# Patient Record
Sex: FEMALE | Race: WHITE | NOT HISPANIC OR LATINO | Employment: FULL TIME | URBAN - METROPOLITAN AREA
[De-identification: names, ages, dates, MRNs, and addresses within clinical notes are randomized per-mention and may not be internally consistent; named-entity substitution may affect disease eponyms.]

---

## 2018-01-15 ENCOUNTER — HOSPITAL ENCOUNTER (OUTPATIENT)
Dept: RADIOLOGY | Facility: HOSPITAL | Age: 58
Discharge: HOME | End: 2018-01-15
Attending: OBSTETRICS & GYNECOLOGY
Payer: COMMERCIAL

## 2018-01-15 DIAGNOSIS — Z12.31 ENCOUNTER FOR SCREENING MAMMOGRAM FOR BREAST CANCER: ICD-10-CM

## 2018-01-15 PROCEDURE — 77063 BREAST TOMOSYNTHESIS BI: CPT | Mod: 26,,, | Performed by: RADIOLOGY

## 2018-01-15 PROCEDURE — 77067 SCR MAMMO BI INCL CAD: CPT | Mod: 26,,, | Performed by: RADIOLOGY

## 2018-01-15 PROCEDURE — 77067 SCR MAMMO BI INCL CAD: CPT | Mod: TC

## 2018-01-24 ENCOUNTER — TELEPHONE (OUTPATIENT)
Dept: RADIOLOGY | Facility: HOSPITAL | Age: 58
End: 2018-01-24

## 2018-01-30 ENCOUNTER — TELEPHONE (OUTPATIENT)
Dept: RADIOLOGY | Facility: HOSPITAL | Age: 58
End: 2018-01-30

## 2018-02-01 ENCOUNTER — HOSPITAL ENCOUNTER (OUTPATIENT)
Dept: RADIOLOGY | Facility: HOSPITAL | Age: 58
Discharge: HOME | End: 2018-02-01
Attending: OBSTETRICS & GYNECOLOGY
Payer: COMMERCIAL

## 2018-02-01 DIAGNOSIS — R92.8 ABNORMAL MAMMOGRAM OF RIGHT BREAST: ICD-10-CM

## 2018-02-01 PROCEDURE — 77061 BREAST TOMOSYNTHESIS UNI: CPT | Mod: 26,,, | Performed by: RADIOLOGY

## 2018-02-01 PROCEDURE — 77065 DX MAMMO INCL CAD UNI: CPT | Mod: 26,,, | Performed by: RADIOLOGY

## 2018-02-01 PROCEDURE — 77061 BREAST TOMOSYNTHESIS UNI: CPT | Mod: TC

## 2018-02-01 PROCEDURE — 77065 DX MAMMO INCL CAD UNI: CPT | Mod: TC

## 2018-02-05 ENCOUNTER — TELEPHONE (OUTPATIENT)
Dept: RADIOLOGY | Facility: HOSPITAL | Age: 58
End: 2018-02-05

## 2018-02-05 NOTE — TELEPHONE ENCOUNTER
Spoke with patient. Reviewed breast biopsy procedure and reviewed instructions for breast biopsy. Patient expressed understanding and all questions were answered. Provided patient with my phone number to call for any further concerns or questions.   Patient scheduled breast biopsy at the [facility] on 2/19/18

## 2018-02-19 ENCOUNTER — HOSPITAL ENCOUNTER (OUTPATIENT)
Dept: RADIOLOGY | Facility: HOSPITAL | Age: 58
Discharge: HOME | End: 2018-02-19
Attending: OBSTETRICS & GYNECOLOGY
Payer: COMMERCIAL

## 2018-02-19 DIAGNOSIS — R92.8 ABNORMAL MAMMOGRAM: ICD-10-CM

## 2018-02-19 PROCEDURE — 77065 DX MAMMO INCL CAD UNI: CPT | Mod: TC,PO,RT

## 2018-02-19 PROCEDURE — 88305 TISSUE EXAM BY PATHOLOGIST: CPT | Mod: 26,,, | Performed by: PATHOLOGY

## 2018-02-19 PROCEDURE — 19081 BX BREAST 1ST LESION STRTCTC: CPT | Mod: TC,PO,RT

## 2018-02-19 PROCEDURE — 27201044 MAMMO BREAST STEREOTACTIC BREAST BIOPSY RIGHT: Mod: PO

## 2018-02-19 PROCEDURE — 88305 TISSUE EXAM BY PATHOLOGIST: CPT | Performed by: PATHOLOGY

## 2018-02-19 PROCEDURE — 88360 TUMOR IMMUNOHISTOCHEM/MANUAL: CPT | Mod: 26,,, | Performed by: PATHOLOGY

## 2018-02-19 PROCEDURE — 77065 DX MAMMO INCL CAD UNI: CPT | Mod: 26,RT,, | Performed by: RADIOLOGY

## 2018-02-19 PROCEDURE — 88342 IMHCHEM/IMCYTCHM 1ST ANTB: CPT | Mod: 26,59,, | Performed by: PATHOLOGY

## 2018-02-19 PROCEDURE — 19081 BX BREAST 1ST LESION STRTCTC: CPT | Mod: RT,,, | Performed by: RADIOLOGY

## 2018-06-12 ENCOUNTER — HOSPITAL ENCOUNTER (OUTPATIENT)
Dept: RADIOLOGY | Facility: OTHER | Age: 58
Discharge: HOME | End: 2018-06-12
Attending: PLASTIC SURGERY
Payer: COMMERCIAL

## 2018-06-12 DIAGNOSIS — Z01.818 PRE-OP EXAM: ICD-10-CM

## 2018-06-12 PROCEDURE — 73706 CT ANGIO LWR EXTR W/O&W/DYE: CPT | Mod: 26,50,, | Performed by: RADIOLOGY

## 2018-06-12 PROCEDURE — 25500020 PHARM REV CODE 255: Performed by: PLASTIC SURGERY

## 2018-06-12 PROCEDURE — 74174 CTA ABD&PLVS W/CONTRAST: CPT | Mod: 26,,, | Performed by: RADIOLOGY

## 2018-06-12 PROCEDURE — 73706 CT ANGIO LWR EXTR W/O&W/DYE: CPT | Mod: 50,TC

## 2018-06-12 PROCEDURE — 74174 CTA ABD&PLVS W/CONTRAST: CPT | Mod: TC

## 2018-06-12 RX ADMIN — IOHEXOL 100 ML: 350 INJECTION, SOLUTION INTRAVENOUS at 09:06

## 2019-12-19 PROBLEM — C50.912 MALIGNANT NEOPLASM OF LEFT FEMALE BREAST: Status: ACTIVE | Noted: 2019-12-19

## 2021-01-29 ENCOUNTER — CLINICAL SUPPORT (OUTPATIENT)
Dept: URGENT CARE | Facility: CLINIC | Age: 61
End: 2021-01-29
Payer: COMMERCIAL

## 2021-01-29 DIAGNOSIS — Z20.822 ENCOUNTER FOR LABORATORY TESTING FOR COVID-19 VIRUS: Primary | ICD-10-CM

## 2021-01-29 LAB
CTP QC/QA: YES
SARS-COV-2 RDRP RESP QL NAA+PROBE: NEGATIVE

## 2021-01-29 PROCEDURE — U0002: ICD-10-PCS | Mod: QW,S$GLB,, | Performed by: STUDENT IN AN ORGANIZED HEALTH CARE EDUCATION/TRAINING PROGRAM

## 2021-01-29 PROCEDURE — U0002 COVID-19 LAB TEST NON-CDC: HCPCS | Mod: QW,S$GLB,, | Performed by: STUDENT IN AN ORGANIZED HEALTH CARE EDUCATION/TRAINING PROGRAM

## 2021-04-12 ENCOUNTER — HOSPITAL ENCOUNTER (INPATIENT)
Facility: HOSPITAL | Age: 61
LOS: 3 days | Discharge: HOME | DRG: 286 | End: 2021-04-15
Attending: EMERGENCY MEDICINE | Admitting: INTERNAL MEDICINE
Payer: COMMERCIAL

## 2021-04-12 DIAGNOSIS — R41.89 UNRESPONSIVE: ICD-10-CM

## 2021-04-12 DIAGNOSIS — R56.9 SEIZURE-LIKE ACTIVITY: ICD-10-CM

## 2021-04-12 DIAGNOSIS — I42.2 HYPERTROPHIC CARDIOMYOPATHY: ICD-10-CM

## 2021-04-12 DIAGNOSIS — I47.29 POLYMORPHIC VENTRICULAR TACHYCARDIA: ICD-10-CM

## 2021-04-12 DIAGNOSIS — R79.89 ELEVATED TROPONIN: ICD-10-CM

## 2021-04-12 DIAGNOSIS — I48.0 PAROXYSMAL A-FIB: ICD-10-CM

## 2021-04-12 DIAGNOSIS — I47.20 V TACH: ICD-10-CM

## 2021-04-12 DIAGNOSIS — I42.1 HOCM (HYPERTROPHIC OBSTRUCTIVE CARDIOMYOPATHY): ICD-10-CM

## 2021-04-12 DIAGNOSIS — I47.20 VENTRICULAR TACHYCARDIA: ICD-10-CM

## 2021-04-12 DIAGNOSIS — R56.9 SEIZURE: ICD-10-CM

## 2021-04-12 DIAGNOSIS — I46.9 CARDIAC ARREST: Primary | ICD-10-CM

## 2021-04-12 DIAGNOSIS — R07.9 CHEST PAIN: ICD-10-CM

## 2021-04-12 PROBLEM — E78.5 HLD (HYPERLIPIDEMIA): Status: ACTIVE | Noted: 2021-04-12

## 2021-04-12 LAB
ALBUMIN SERPL BCP-MCNC: 4.2 G/DL (ref 3.5–5.2)
ALP SERPL-CCNC: 68 U/L (ref 55–135)
ALT SERPL W/O P-5'-P-CCNC: 45 U/L (ref 10–44)
AMPHET+METHAMPHET UR QL: NEGATIVE
ANION GAP SERPL CALC-SCNC: 9 MMOL/L (ref 8–16)
AST SERPL-CCNC: 58 U/L (ref 10–40)
BACTERIA #/AREA URNS HPF: ABNORMAL /HPF
BARBITURATES UR QL SCN>200 NG/ML: NEGATIVE
BASOPHILS # BLD AUTO: 0.1 K/UL (ref 0–0.2)
BASOPHILS NFR BLD: 0.9 % (ref 0–1.9)
BENZODIAZ UR QL SCN>200 NG/ML: NEGATIVE
BILIRUB SERPL-MCNC: 0.7 MG/DL (ref 0.1–1)
BILIRUB UR QL STRIP: NEGATIVE
BUN SERPL-MCNC: 17 MG/DL (ref 6–20)
BZE UR QL SCN: NEGATIVE
CALCIUM SERPL-MCNC: 8.4 MG/DL (ref 8.7–10.5)
CANNABINOIDS UR QL SCN: NEGATIVE
CHLORIDE SERPL-SCNC: 110 MMOL/L (ref 95–110)
CK SERPL-CCNC: 107 U/L (ref 20–180)
CLARITY UR: CLEAR
CO2 SERPL-SCNC: 20 MMOL/L (ref 23–29)
COLOR UR: YELLOW
CREAT SERPL-MCNC: 0.9 MG/DL (ref 0.5–1.4)
CREAT UR-MCNC: 84.6 MG/DL (ref 15–325)
DIFFERENTIAL METHOD: ABNORMAL
EOSINOPHIL # BLD AUTO: 0.2 K/UL (ref 0–0.5)
EOSINOPHIL NFR BLD: 1.7 % (ref 0–8)
ERYTHROCYTE [DISTWIDTH] IN BLOOD BY AUTOMATED COUNT: 13.2 % (ref 11.5–14.5)
EST. GFR  (AFRICAN AMERICAN): >60 ML/MIN/1.73 M^2
EST. GFR  (NON AFRICAN AMERICAN): >60 ML/MIN/1.73 M^2
GLUCOSE SERPL-MCNC: 174 MG/DL (ref 70–110)
GLUCOSE UR QL STRIP: NEGATIVE
HCT VFR BLD AUTO: 43.5 % (ref 37–48.5)
HGB BLD-MCNC: 13.8 G/DL (ref 12–16)
HGB UR QL STRIP: ABNORMAL
HYALINE CASTS #/AREA URNS LPF: 15 /LPF
IMM GRANULOCYTES # BLD AUTO: 0.08 K/UL (ref 0–0.04)
IMM GRANULOCYTES NFR BLD AUTO: 0.7 % (ref 0–0.5)
KETONES UR QL STRIP: NEGATIVE
LACTATE SERPL-SCNC: 1.9 MMOL/L (ref 0.5–2.2)
LACTATE SERPL-SCNC: 2.6 MMOL/L (ref 0.5–2.2)
LEUKOCYTE ESTERASE UR QL STRIP: NEGATIVE
LYMPHOCYTES # BLD AUTO: 2.7 K/UL (ref 1–4.8)
LYMPHOCYTES NFR BLD: 23.5 % (ref 18–48)
MAGNESIUM SERPL-MCNC: 2 MG/DL (ref 1.6–2.6)
MCH RBC QN AUTO: 32.1 PG (ref 27–31)
MCHC RBC AUTO-ENTMCNC: 31.7 G/DL (ref 32–36)
MCV RBC AUTO: 101 FL (ref 82–98)
METHADONE UR QL SCN>300 NG/ML: NEGATIVE
MICROSCOPIC COMMENT: ABNORMAL
MONOCYTES # BLD AUTO: 0.7 K/UL (ref 0.3–1)
MONOCYTES NFR BLD: 6.2 % (ref 4–15)
NEUTROPHILS # BLD AUTO: 7.6 K/UL (ref 1.8–7.7)
NEUTROPHILS NFR BLD: 67 % (ref 38–73)
NITRITE UR QL STRIP: NEGATIVE
NRBC BLD-RTO: 0 /100 WBC
OPIATES UR QL SCN: NEGATIVE
PCP UR QL SCN>25 NG/ML: NEGATIVE
PH UR STRIP: 6 [PH] (ref 5–8)
PLATELET # BLD AUTO: 184 K/UL (ref 150–450)
PMV BLD AUTO: 11.5 FL (ref 9.2–12.9)
POTASSIUM SERPL-SCNC: 4.2 MMOL/L (ref 3.5–5.1)
PROT SERPL-MCNC: 6.8 G/DL (ref 6–8.4)
PROT UR QL STRIP: ABNORMAL
RBC # BLD AUTO: 4.3 M/UL (ref 4–5.4)
RBC #/AREA URNS HPF: 10 /HPF (ref 0–4)
SARS-COV-2 RDRP RESP QL NAA+PROBE: NEGATIVE
SODIUM SERPL-SCNC: 139 MMOL/L (ref 136–145)
SP GR UR STRIP: 1.03 (ref 1–1.03)
SQUAMOUS #/AREA URNS HPF: 3 /HPF
TOXICOLOGY INFORMATION: NORMAL
TROPONIN I SERPL DL<=0.01 NG/ML-MCNC: 0.06 NG/ML (ref 0–0.03)
TROPONIN I SERPL DL<=0.01 NG/ML-MCNC: 0.11 NG/ML (ref 0–0.03)
TROPONIN I SERPL DL<=0.01 NG/ML-MCNC: 0.22 NG/ML (ref 0–0.03)
URN SPEC COLLECT METH UR: ABNORMAL
UROBILINOGEN UR STRIP-ACNC: NEGATIVE EU/DL
WBC # BLD AUTO: 11.35 K/UL (ref 3.9–12.7)
WBC #/AREA URNS HPF: 0 /HPF (ref 0–5)

## 2021-04-12 PROCEDURE — 96365 THER/PROPH/DIAG IV INF INIT: CPT

## 2021-04-12 PROCEDURE — 93010 EKG 12-LEAD: ICD-10-PCS | Mod: 77,,, | Performed by: INTERNAL MEDICINE

## 2021-04-12 PROCEDURE — 93005 ELECTROCARDIOGRAM TRACING: CPT

## 2021-04-12 PROCEDURE — 99292 CRITICAL CARE ADDL 30 MIN: CPT

## 2021-04-12 PROCEDURE — 84484 ASSAY OF TROPONIN QUANT: CPT | Mod: 91 | Performed by: EMERGENCY MEDICINE

## 2021-04-12 PROCEDURE — 99291 CRITICAL CARE FIRST HOUR: CPT | Mod: 25

## 2021-04-12 PROCEDURE — 93010 ELECTROCARDIOGRAM REPORT: CPT | Mod: 77,,, | Performed by: INTERNAL MEDICINE

## 2021-04-12 PROCEDURE — 85730 THROMBOPLASTIN TIME PARTIAL: CPT | Performed by: STUDENT IN AN ORGANIZED HEALTH CARE EDUCATION/TRAINING PROGRAM

## 2021-04-12 PROCEDURE — 80307 DRUG TEST PRSMV CHEM ANLYZR: CPT | Performed by: EMERGENCY MEDICINE

## 2021-04-12 PROCEDURE — 20000000 HC ICU ROOM

## 2021-04-12 PROCEDURE — 93010 EKG 12-LEAD: ICD-10-PCS | Mod: ,,, | Performed by: INTERNAL MEDICINE

## 2021-04-12 PROCEDURE — 81000 URINALYSIS NONAUTO W/SCOPE: CPT | Mod: 59 | Performed by: EMERGENCY MEDICINE

## 2021-04-12 PROCEDURE — 85610 PROTHROMBIN TIME: CPT | Performed by: STUDENT IN AN ORGANIZED HEALTH CARE EDUCATION/TRAINING PROGRAM

## 2021-04-12 PROCEDURE — 96367 TX/PROPH/DG ADDL SEQ IV INF: CPT

## 2021-04-12 PROCEDURE — 83605 ASSAY OF LACTIC ACID: CPT | Performed by: EMERGENCY MEDICINE

## 2021-04-12 PROCEDURE — 83735 ASSAY OF MAGNESIUM: CPT | Performed by: EMERGENCY MEDICINE

## 2021-04-12 PROCEDURE — 96361 HYDRATE IV INFUSION ADD-ON: CPT

## 2021-04-12 PROCEDURE — 96375 TX/PRO/DX INJ NEW DRUG ADDON: CPT

## 2021-04-12 PROCEDURE — 63600175 PHARM REV CODE 636 W HCPCS: Performed by: EMERGENCY MEDICINE

## 2021-04-12 PROCEDURE — 80053 COMPREHEN METABOLIC PANEL: CPT | Performed by: EMERGENCY MEDICINE

## 2021-04-12 PROCEDURE — 96366 THER/PROPH/DIAG IV INF ADDON: CPT

## 2021-04-12 PROCEDURE — 85025 COMPLETE CBC W/AUTO DIFF WBC: CPT | Performed by: EMERGENCY MEDICINE

## 2021-04-12 PROCEDURE — U0002 COVID-19 LAB TEST NON-CDC: HCPCS | Performed by: EMERGENCY MEDICINE

## 2021-04-12 PROCEDURE — 63600175 PHARM REV CODE 636 W HCPCS

## 2021-04-12 PROCEDURE — 82550 ASSAY OF CK (CPK): CPT | Performed by: EMERGENCY MEDICINE

## 2021-04-12 PROCEDURE — 36415 COLL VENOUS BLD VENIPUNCTURE: CPT | Performed by: STUDENT IN AN ORGANIZED HEALTH CARE EDUCATION/TRAINING PROGRAM

## 2021-04-12 PROCEDURE — 93010 ELECTROCARDIOGRAM REPORT: CPT | Mod: ,,, | Performed by: INTERNAL MEDICINE

## 2021-04-12 PROCEDURE — 84484 ASSAY OF TROPONIN QUANT: CPT | Mod: 91 | Performed by: STUDENT IN AN ORGANIZED HEALTH CARE EDUCATION/TRAINING PROGRAM

## 2021-04-12 PROCEDURE — 25000003 PHARM REV CODE 250: Performed by: EMERGENCY MEDICINE

## 2021-04-12 RX ORDER — LEVETIRACETAM 10 MG/ML
1000 INJECTION INTRAVASCULAR
Status: DISPENSED | OUTPATIENT
Start: 2021-04-12 | End: 2021-04-12

## 2021-04-12 RX ORDER — MUPIROCIN 20 MG/G
OINTMENT TOPICAL 2 TIMES DAILY
Status: DISCONTINUED | OUTPATIENT
Start: 2021-04-12 | End: 2021-04-15 | Stop reason: HOSPADM

## 2021-04-12 RX ORDER — SODIUM CHLORIDE 0.9 % (FLUSH) 0.9 %
10 SYRINGE (ML) INJECTION
Status: DISCONTINUED | OUTPATIENT
Start: 2021-04-13 | End: 2021-04-15 | Stop reason: HOSPADM

## 2021-04-12 RX ORDER — ATORVASTATIN CALCIUM 40 MG/1
40 TABLET, FILM COATED ORAL DAILY
Status: DISCONTINUED | OUTPATIENT
Start: 2021-04-13 | End: 2021-04-15 | Stop reason: HOSPADM

## 2021-04-12 RX ORDER — MAGNESIUM SULFATE HEPTAHYDRATE 40 MG/ML
2 INJECTION, SOLUTION INTRAVENOUS ONCE
Status: COMPLETED | OUTPATIENT
Start: 2021-04-12 | End: 2021-04-12

## 2021-04-12 RX ORDER — HEPARIN SODIUM,PORCINE/D5W 25000/250
0-24 INTRAVENOUS SOLUTION INTRAVENOUS CONTINUOUS
Status: DISCONTINUED | OUTPATIENT
Start: 2021-04-12 | End: 2021-04-13

## 2021-04-12 RX ORDER — LORAZEPAM 2 MG/ML
INJECTION INTRAMUSCULAR
Status: COMPLETED
Start: 2021-04-12 | End: 2021-04-12

## 2021-04-12 RX ORDER — ATORVASTATIN CALCIUM 40 MG/1
40 TABLET, FILM COATED ORAL DAILY
COMMUNITY

## 2021-04-12 RX ORDER — AMIODARONE HYDROCHLORIDE 100 MG/1
100 TABLET ORAL DAILY
COMMUNITY

## 2021-04-12 RX ORDER — MAGNESIUM SULFATE HEPTAHYDRATE 40 MG/ML
0.5 INJECTION, SOLUTION INTRAVENOUS CONTINUOUS
Status: DISCONTINUED | OUTPATIENT
Start: 2021-04-12 | End: 2021-04-12

## 2021-04-12 RX ORDER — ASPIRIN 325 MG
325 TABLET ORAL DAILY
Status: DISCONTINUED | OUTPATIENT
Start: 2021-04-13 | End: 2021-04-13

## 2021-04-12 RX ORDER — ATORVASTATIN CALCIUM 40 MG/1
40 TABLET, FILM COATED ORAL DAILY
Status: DISCONTINUED | OUTPATIENT
Start: 2021-04-13 | End: 2021-04-12

## 2021-04-12 RX ORDER — MONTELUKAST SODIUM 10 MG/1
10 TABLET ORAL DAILY
Status: DISCONTINUED | OUTPATIENT
Start: 2021-04-13 | End: 2021-04-15 | Stop reason: HOSPADM

## 2021-04-12 RX ADMIN — MAGNESIUM SULFATE IN WATER 2 G: 40 INJECTION, SOLUTION INTRAVENOUS at 11:04

## 2021-04-12 RX ADMIN — LORAZEPAM 2 MG: 2 INJECTION INTRAMUSCULAR; INTRAVENOUS at 07:04

## 2021-04-12 RX ADMIN — SODIUM CHLORIDE 1000 ML: 0.9 INJECTION, SOLUTION INTRAVENOUS at 07:04

## 2021-04-12 RX ADMIN — LORAZEPAM 2 MG: 2 INJECTION INTRAMUSCULAR; INTRAVENOUS at 08:04

## 2021-04-12 RX ADMIN — MAGNESIUM SULFATE IN WATER 2 G: 40 INJECTION, SOLUTION INTRAVENOUS at 10:04

## 2021-04-12 RX ADMIN — LEVETIRACETAM 1000 MG: 10 INJECTION INTRAVENOUS at 07:04

## 2021-04-12 RX ADMIN — LORAZEPAM: 2 INJECTION INTRAMUSCULAR; INTRAVENOUS at 08:04

## 2021-04-13 PROBLEM — I25.10 CORONARY ARTERY DISEASE INVOLVING NATIVE CORONARY ARTERY WITHOUT ANGINA PECTORIS: Status: ACTIVE | Noted: 2021-04-13

## 2021-04-13 PROBLEM — I25.119 CORONARY ARTERY DISEASE INVOLVING NATIVE CORONARY ARTERY WITH ANGINA PECTORIS: Status: ACTIVE | Noted: 2021-04-13

## 2021-04-13 LAB
ALBUMIN SERPL BCP-MCNC: 3.7 G/DL (ref 3.5–5.2)
ALLENS TEST: ABNORMAL
ALP SERPL-CCNC: 54 U/L (ref 55–135)
ALT SERPL W/O P-5'-P-CCNC: 32 U/L (ref 10–44)
AMMONIA PLAS-SCNC: 36 UMOL/L (ref 10–50)
ANION GAP SERPL CALC-SCNC: 5 MMOL/L (ref 8–16)
AORTIC ROOT ANNULUS: 3.62 CM
APTT BLDCRRT: 25.9 SEC (ref 21–32)
APTT BLDCRRT: 34.3 SEC (ref 21–32)
APTT BLDCRRT: 41.6 SEC (ref 21–32)
ASCENDING AORTA: 3.09 CM
AST SERPL-CCNC: 41 U/L (ref 10–40)
BASOPHILS # BLD AUTO: 0.05 K/UL (ref 0–0.2)
BASOPHILS NFR BLD: 0.6 % (ref 0–1.9)
BILIRUB SERPL-MCNC: 1.3 MG/DL (ref 0.1–1)
BNP SERPL-MCNC: 559 PG/ML (ref 0–99)
BSA FOR ECHO PROCEDURE: 2.05 M2
BUN SERPL-MCNC: 14 MG/DL (ref 6–20)
CALCIUM SERPL-MCNC: 8.4 MG/DL (ref 8.7–10.5)
CHLORIDE SERPL-SCNC: 107 MMOL/L (ref 95–110)
CO2 SERPL-SCNC: 25 MMOL/L (ref 23–29)
CREAT SERPL-MCNC: 0.7 MG/DL (ref 0.5–1.4)
CV ECHO LV RWT: 0.54 CM
DELSYS: ABNORMAL
DIFFERENTIAL METHOD: ABNORMAL
DOP CALC LVOT AREA: 3.8 CM2
DOP CALC LVOT DIAMETER: 2.19 CM
E WAVE DECELERATION TIME: 318.51 MSEC
E/A RATIO: 0.77
E/E' RATIO: 15 M/S
ECHO LV POSTERIOR WALL: 1.18 CM (ref 0.6–1.1)
EJECTION FRACTION: 60 %
EOSINOPHIL # BLD AUTO: 0.1 K/UL (ref 0–0.5)
EOSINOPHIL NFR BLD: 0.8 % (ref 0–8)
ERYTHROCYTE [DISTWIDTH] IN BLOOD BY AUTOMATED COUNT: 13.2 % (ref 11.5–14.5)
EST. GFR  (AFRICAN AMERICAN): >60 ML/MIN/1.73 M^2
EST. GFR  (NON AFRICAN AMERICAN): >60 ML/MIN/1.73 M^2
FRACTIONAL SHORTENING: 53 % (ref 28–44)
GLUCOSE SERPL-MCNC: 101 MG/DL (ref 70–110)
HCO3 UR-SCNC: 25 MMOL/L (ref 24–28)
HCT VFR BLD AUTO: 36.5 % (ref 37–48.5)
HGB BLD-MCNC: 12.3 G/DL (ref 12–16)
IMM GRANULOCYTES # BLD AUTO: 0.03 K/UL (ref 0–0.04)
IMM GRANULOCYTES NFR BLD AUTO: 0.3 % (ref 0–0.5)
INR PPP: 1 (ref 0.8–1.2)
INTERVENTRICULAR SEPTUM: 2.47 CM (ref 0.6–1.1)
LA MAJOR: 6.75 CM
LA MINOR: 5.98 CM
LA WIDTH: 5.4 CM
LEFT ATRIUM SIZE: 4.56 CM
LEFT ATRIUM VOLUME INDEX MOD: 54.6 ML/M2
LEFT ATRIUM VOLUME INDEX: 66.4 ML/M2
LEFT ATRIUM VOLUME MOD: 109.15 CM3
LEFT ATRIUM VOLUME: 132.73 CM3
LEFT INTERNAL DIMENSION IN SYSTOLE: 2.06 CM (ref 2.1–4)
LEFT VENTRICLE DIASTOLIC VOLUME INDEX: 43.24 ML/M2
LEFT VENTRICLE DIASTOLIC VOLUME: 86.48 ML
LEFT VENTRICLE MASS INDEX: 180 G/M2
LEFT VENTRICLE SYSTOLIC VOLUME INDEX: 6.8 ML/M2
LEFT VENTRICLE SYSTOLIC VOLUME: 13.69 ML
LEFT VENTRICULAR INTERNAL DIMENSION IN DIASTOLE: 4.37 CM (ref 3.5–6)
LEFT VENTRICULAR MASS: 360.35 G
LV LATERAL E/E' RATIO: 12.5 M/S
LV SEPTAL E/E' RATIO: 18.75 M/S
LYMPHOCYTES # BLD AUTO: 1.6 K/UL (ref 1–4.8)
LYMPHOCYTES NFR BLD: 17.6 % (ref 18–48)
MAGNESIUM SERPL-MCNC: 2.4 MG/DL (ref 1.6–2.6)
MCH RBC QN AUTO: 31.6 PG (ref 27–31)
MCHC RBC AUTO-ENTMCNC: 33.7 G/DL (ref 32–36)
MCV RBC AUTO: 94 FL (ref 82–98)
MONOCYTES # BLD AUTO: 0.6 K/UL (ref 0.3–1)
MONOCYTES NFR BLD: 7.1 % (ref 4–15)
MV PEAK A VEL: 0.98 M/S
MV PEAK E VEL: 0.75 M/S
MV PEAK GRADIENT: 4 MMHG
MV STENOSIS PRESSURE HALF TIME: 92.37 MS
MV VALVE AREA P 1/2 METHOD: 2.38 CM2
NEUTROPHILS # BLD AUTO: 6.6 K/UL (ref 1.8–7.7)
NEUTROPHILS NFR BLD: 73.6 % (ref 38–73)
NRBC BLD-RTO: 0 /100 WBC
PCO2 BLDA: 38.4 MMHG (ref 35–45)
PH SMN: 7.42 [PH] (ref 7.35–7.45)
PHOSPHATE SERPL-MCNC: 3.2 MG/DL (ref 2.7–4.5)
PLATELET # BLD AUTO: 173 K/UL (ref 150–450)
PMV BLD AUTO: 11.5 FL (ref 9.2–12.9)
PO2 BLDA: 72 MMHG (ref 80–100)
POC BE: 1 MMOL/L
POC SATURATED O2: 95 % (ref 95–100)
POC TCO2: 26 MMOL/L (ref 23–27)
POTASSIUM SERPL-SCNC: 3.9 MMOL/L (ref 3.5–5.1)
PROT SERPL-MCNC: 6.1 G/DL (ref 6–8.4)
PROTHROMBIN TIME: 10.8 SEC (ref 9–12.5)
PV PEAK VELOCITY: 1.78 CM/S
RA MAJOR: 5.3 CM
RA WIDTH: 3.33 CM
RBC # BLD AUTO: 3.89 M/UL (ref 4–5.4)
RIGHT VENTRICULAR END-DIASTOLIC DIMENSION: 2.96 CM
RV TISSUE DOPPLER FREE WALL SYSTOLIC VELOCITY 1 (APICAL 4 CHAMBER VIEW): 15.09 CM/S
SAMPLE: ABNORMAL
SITE: ABNORMAL
SODIUM SERPL-SCNC: 137 MMOL/L (ref 136–145)
STJ: 2.85 CM
TDI LATERAL: 0.06 M/S
TDI SEPTAL: 0.04 M/S
TDI: 0.05 M/S
TRICUSPID ANNULAR PLANE SYSTOLIC EXCURSION: 2.48 CM
TROPONIN I SERPL DL<=0.01 NG/ML-MCNC: 0.21 NG/ML (ref 0–0.03)
TROPONIN I SERPL DL<=0.01 NG/ML-MCNC: 0.23 NG/ML (ref 0–0.03)
TSH SERPL DL<=0.005 MIU/L-ACNC: 2.09 UIU/ML (ref 0.4–4)
WBC # BLD AUTO: 8.97 K/UL (ref 3.9–12.7)

## 2021-04-13 PROCEDURE — 99221 1ST HOSP IP/OBS SF/LOW 40: CPT | Mod: 25,,, | Performed by: INTERNAL MEDICINE

## 2021-04-13 PROCEDURE — 94761 N-INVAS EAR/PLS OXIMETRY MLT: CPT

## 2021-04-13 PROCEDURE — 63600175 PHARM REV CODE 636 W HCPCS: Performed by: INTERNAL MEDICINE

## 2021-04-13 PROCEDURE — 93010 ELECTROCARDIOGRAM REPORT: CPT | Mod: ,,, | Performed by: INTERNAL MEDICINE

## 2021-04-13 PROCEDURE — 99152 MOD SED SAME PHYS/QHP 5/>YRS: CPT | Performed by: INTERNAL MEDICINE

## 2021-04-13 PROCEDURE — 99223 PR INITIAL HOSPITAL CARE,LEVL III: ICD-10-PCS | Mod: ,,, | Performed by: PSYCHIATRY & NEUROLOGY

## 2021-04-13 PROCEDURE — 93010 EKG 12-LEAD: ICD-10-PCS | Mod: ,,, | Performed by: INTERNAL MEDICINE

## 2021-04-13 PROCEDURE — 93458 PR CATH PLACE/CORON ANGIO, IMG SUPER/INTERP,W LEFT HEART VENTRICULOGRAPHY: ICD-10-PCS | Mod: 26,,, | Performed by: INTERNAL MEDICINE

## 2021-04-13 PROCEDURE — 99152 MOD SED SAME PHYS/QHP 5/>YRS: CPT | Mod: ,,, | Performed by: INTERNAL MEDICINE

## 2021-04-13 PROCEDURE — 95819 EEG AWAKE AND ASLEEP: CPT | Mod: 26,,, | Performed by: PSYCHIATRY & NEUROLOGY

## 2021-04-13 PROCEDURE — 84443 ASSAY THYROID STIM HORMONE: CPT | Performed by: STUDENT IN AN ORGANIZED HEALTH CARE EDUCATION/TRAINING PROGRAM

## 2021-04-13 PROCEDURE — 84100 ASSAY OF PHOSPHORUS: CPT | Performed by: STUDENT IN AN ORGANIZED HEALTH CARE EDUCATION/TRAINING PROGRAM

## 2021-04-13 PROCEDURE — 85730 THROMBOPLASTIN TIME PARTIAL: CPT | Mod: 91 | Performed by: INTERNAL MEDICINE

## 2021-04-13 PROCEDURE — 83735 ASSAY OF MAGNESIUM: CPT | Performed by: STUDENT IN AN ORGANIZED HEALTH CARE EDUCATION/TRAINING PROGRAM

## 2021-04-13 PROCEDURE — 99221 PR INITIAL HOSPITAL CARE,LEVL I: ICD-10-PCS | Mod: 25,,, | Performed by: INTERNAL MEDICINE

## 2021-04-13 PROCEDURE — C1769 GUIDE WIRE: HCPCS | Performed by: INTERNAL MEDICINE

## 2021-04-13 PROCEDURE — 25000003 PHARM REV CODE 250: Performed by: NURSE PRACTITIONER

## 2021-04-13 PROCEDURE — C1894 INTRO/SHEATH, NON-LASER: HCPCS | Performed by: INTERNAL MEDICINE

## 2021-04-13 PROCEDURE — 93458 L HRT ARTERY/VENTRICLE ANGIO: CPT | Mod: 26,,, | Performed by: INTERNAL MEDICINE

## 2021-04-13 PROCEDURE — 25500020 PHARM REV CODE 255: Performed by: INTERNAL MEDICINE

## 2021-04-13 PROCEDURE — 80053 COMPREHEN METABOLIC PANEL: CPT | Performed by: STUDENT IN AN ORGANIZED HEALTH CARE EDUCATION/TRAINING PROGRAM

## 2021-04-13 PROCEDURE — 82140 ASSAY OF AMMONIA: CPT | Performed by: STUDENT IN AN ORGANIZED HEALTH CARE EDUCATION/TRAINING PROGRAM

## 2021-04-13 PROCEDURE — 20000000 HC ICU ROOM

## 2021-04-13 PROCEDURE — 83880 ASSAY OF NATRIURETIC PEPTIDE: CPT | Performed by: STUDENT IN AN ORGANIZED HEALTH CARE EDUCATION/TRAINING PROGRAM

## 2021-04-13 PROCEDURE — 99152 PR MOD CONSCIOUS SEDATION, SAME PHYS, 5+ YRS, FIRST 15 MIN: ICD-10-PCS | Mod: ,,, | Performed by: INTERNAL MEDICINE

## 2021-04-13 PROCEDURE — 93005 ELECTROCARDIOGRAM TRACING: CPT

## 2021-04-13 PROCEDURE — 95819 EEG AWAKE AND ASLEEP: CPT

## 2021-04-13 PROCEDURE — 84484 ASSAY OF TROPONIN QUANT: CPT | Performed by: STUDENT IN AN ORGANIZED HEALTH CARE EDUCATION/TRAINING PROGRAM

## 2021-04-13 PROCEDURE — 25000003 PHARM REV CODE 250: Performed by: STUDENT IN AN ORGANIZED HEALTH CARE EDUCATION/TRAINING PROGRAM

## 2021-04-13 PROCEDURE — 99223 1ST HOSP IP/OBS HIGH 75: CPT | Mod: ,,, | Performed by: PSYCHIATRY & NEUROLOGY

## 2021-04-13 PROCEDURE — 25000003 PHARM REV CODE 250: Performed by: INTERNAL MEDICINE

## 2021-04-13 PROCEDURE — 99153 MOD SED SAME PHYS/QHP EA: CPT | Performed by: INTERNAL MEDICINE

## 2021-04-13 PROCEDURE — C1887 CATHETER, GUIDING: HCPCS | Performed by: INTERNAL MEDICINE

## 2021-04-13 PROCEDURE — 85025 COMPLETE CBC W/AUTO DIFF WBC: CPT | Performed by: STUDENT IN AN ORGANIZED HEALTH CARE EDUCATION/TRAINING PROGRAM

## 2021-04-13 PROCEDURE — 84425 ASSAY OF VITAMIN B-1: CPT | Performed by: STUDENT IN AN ORGANIZED HEALTH CARE EDUCATION/TRAINING PROGRAM

## 2021-04-13 PROCEDURE — 95819 PR EEG,W/AWAKE & ASLEEP RECORD: ICD-10-PCS | Mod: 26,,, | Performed by: PSYCHIATRY & NEUROLOGY

## 2021-04-13 PROCEDURE — 93458 L HRT ARTERY/VENTRICLE ANGIO: CPT | Performed by: INTERNAL MEDICINE

## 2021-04-13 PROCEDURE — 63600175 PHARM REV CODE 636 W HCPCS: Performed by: STUDENT IN AN ORGANIZED HEALTH CARE EDUCATION/TRAINING PROGRAM

## 2021-04-13 PROCEDURE — 36415 COLL VENOUS BLD VENIPUNCTURE: CPT | Performed by: STUDENT IN AN ORGANIZED HEALTH CARE EDUCATION/TRAINING PROGRAM

## 2021-04-13 RX ORDER — LEVETIRACETAM 500 MG/1
500 TABLET ORAL 2 TIMES DAILY
Status: DISCONTINUED | OUTPATIENT
Start: 2021-04-13 | End: 2021-04-13

## 2021-04-13 RX ORDER — IODIXANOL 320 MG/ML
INJECTION, SOLUTION INTRAVASCULAR
Status: DISCONTINUED | OUTPATIENT
Start: 2021-04-13 | End: 2021-04-13 | Stop reason: HOSPADM

## 2021-04-13 RX ORDER — SODIUM CHLORIDE 9 MG/ML
INJECTION, SOLUTION INTRAVENOUS CONTINUOUS
Status: ACTIVE | OUTPATIENT
Start: 2021-04-13 | End: 2021-04-13

## 2021-04-13 RX ORDER — FENTANYL CITRATE 50 UG/ML
INJECTION, SOLUTION INTRAMUSCULAR; INTRAVENOUS
Status: DISCONTINUED | OUTPATIENT
Start: 2021-04-13 | End: 2021-04-13 | Stop reason: HOSPADM

## 2021-04-13 RX ORDER — CLOPIDOGREL BISULFATE 75 MG/1
300 TABLET ORAL ONCE
Status: COMPLETED | OUTPATIENT
Start: 2021-04-13 | End: 2021-04-13

## 2021-04-13 RX ORDER — HEPARIN SODIUM 1000 [USP'U]/ML
INJECTION, SOLUTION INTRAVENOUS; SUBCUTANEOUS
Status: DISCONTINUED | OUTPATIENT
Start: 2021-04-13 | End: 2021-04-13 | Stop reason: HOSPADM

## 2021-04-13 RX ORDER — VERAPAMIL HYDROCHLORIDE 2.5 MG/ML
INJECTION, SOLUTION INTRAVENOUS
Status: DISCONTINUED | OUTPATIENT
Start: 2021-04-13 | End: 2021-04-13 | Stop reason: HOSPADM

## 2021-04-13 RX ORDER — CLOPIDOGREL BISULFATE 75 MG/1
75 TABLET ORAL DAILY
Status: DISCONTINUED | OUTPATIENT
Start: 2021-04-14 | End: 2021-04-13

## 2021-04-13 RX ORDER — MIDAZOLAM HYDROCHLORIDE 1 MG/ML
INJECTION, SOLUTION INTRAMUSCULAR; INTRAVENOUS
Status: DISCONTINUED | OUTPATIENT
Start: 2021-04-13 | End: 2021-04-13 | Stop reason: HOSPADM

## 2021-04-13 RX ORDER — LEVETIRACETAM 500 MG/1
1000 TABLET ORAL 2 TIMES DAILY
Status: DISCONTINUED | OUTPATIENT
Start: 2021-04-13 | End: 2021-04-15 | Stop reason: HOSPADM

## 2021-04-13 RX ORDER — DIPHENHYDRAMINE HYDROCHLORIDE 50 MG/ML
INJECTION INTRAMUSCULAR; INTRAVENOUS
Status: DISCONTINUED | OUTPATIENT
Start: 2021-04-13 | End: 2021-04-13 | Stop reason: HOSPADM

## 2021-04-13 RX ORDER — METOPROLOL TARTRATE 50 MG/1
50 TABLET ORAL 2 TIMES DAILY
Status: DISCONTINUED | OUTPATIENT
Start: 2021-04-13 | End: 2021-04-14

## 2021-04-13 RX ORDER — NAPROXEN SODIUM 220 MG/1
81 TABLET, FILM COATED ORAL DAILY
Status: DISCONTINUED | OUTPATIENT
Start: 2021-04-13 | End: 2021-04-15 | Stop reason: HOSPADM

## 2021-04-13 RX ORDER — HEPARIN SODIUM 200 [USP'U]/100ML
INJECTION, SOLUTION INTRAVENOUS
Status: DISCONTINUED | OUTPATIENT
Start: 2021-04-13 | End: 2021-04-14

## 2021-04-13 RX ADMIN — ATORVASTATIN CALCIUM 40 MG: 40 TABLET, FILM COATED ORAL at 10:04

## 2021-04-13 RX ADMIN — METOPROLOL TARTRATE 50 MG: 50 TABLET, FILM COATED ORAL at 11:04

## 2021-04-13 RX ADMIN — ASPIRIN 325 MG ORAL TABLET 325 MG: 325 PILL ORAL at 12:04

## 2021-04-13 RX ADMIN — LEVETIRACETAM 500 MG: 500 TABLET ORAL at 10:04

## 2021-04-13 RX ADMIN — CLOPIDOGREL 300 MG: 75 TABLET, FILM COATED ORAL at 04:04

## 2021-04-13 RX ADMIN — MUPIROCIN: 20 OINTMENT TOPICAL at 10:04

## 2021-04-13 RX ADMIN — HEPARIN SODIUM 12 UNITS/KG/HR: 10000 INJECTION, SOLUTION INTRAVENOUS at 12:04

## 2021-04-13 RX ADMIN — LEVETIRACETAM 1000 MG: 500 TABLET ORAL at 08:04

## 2021-04-13 RX ADMIN — MUPIROCIN: 20 OINTMENT TOPICAL at 08:04

## 2021-04-13 RX ADMIN — MONTELUKAST 10 MG: 10 TABLET, FILM COATED ORAL at 10:04

## 2021-04-13 RX ADMIN — SODIUM CHLORIDE 125 ML/HR: 0.9 INJECTION, SOLUTION INTRAVENOUS at 01:04

## 2021-04-13 RX ADMIN — ATORVASTATIN CALCIUM 40 MG: 40 TABLET, FILM COATED ORAL at 12:04

## 2021-04-13 RX ADMIN — MUPIROCIN: 20 OINTMENT TOPICAL at 12:04

## 2021-04-13 RX ADMIN — ASPIRIN 81 MG CHEWABLE TABLET 81 MG: 81 TABLET CHEWABLE at 10:04

## 2021-04-14 LAB
ALBUMIN SERPL BCP-MCNC: 3.7 G/DL (ref 3.5–5.2)
ALP SERPL-CCNC: 54 U/L (ref 55–135)
ALT SERPL W/O P-5'-P-CCNC: 26 U/L (ref 10–44)
ANION GAP SERPL CALC-SCNC: 8 MMOL/L (ref 8–16)
AST SERPL-CCNC: 30 U/L (ref 10–40)
BASOPHILS # BLD AUTO: 0.07 K/UL (ref 0–0.2)
BASOPHILS NFR BLD: 1.1 % (ref 0–1.9)
BILIRUB SERPL-MCNC: 1.1 MG/DL (ref 0.1–1)
BUN SERPL-MCNC: 12 MG/DL (ref 6–20)
CALCIUM SERPL-MCNC: 8.7 MG/DL (ref 8.7–10.5)
CATH EF ESTIMATED: 60 %
CHLORIDE SERPL-SCNC: 108 MMOL/L (ref 95–110)
CO2 SERPL-SCNC: 23 MMOL/L (ref 23–29)
CREAT SERPL-MCNC: 0.8 MG/DL (ref 0.5–1.4)
DIFFERENTIAL METHOD: ABNORMAL
EOSINOPHIL # BLD AUTO: 0.1 K/UL (ref 0–0.5)
EOSINOPHIL NFR BLD: 1.5 % (ref 0–8)
ERYTHROCYTE [DISTWIDTH] IN BLOOD BY AUTOMATED COUNT: 12.7 % (ref 11.5–14.5)
EST. GFR  (AFRICAN AMERICAN): >60 ML/MIN/1.73 M^2
EST. GFR  (NON AFRICAN AMERICAN): >60 ML/MIN/1.73 M^2
GLUCOSE SERPL-MCNC: 122 MG/DL (ref 70–110)
HCT VFR BLD AUTO: 37.7 % (ref 37–48.5)
HGB BLD-MCNC: 12.6 G/DL (ref 12–16)
IMM GRANULOCYTES # BLD AUTO: 0.01 K/UL (ref 0–0.04)
IMM GRANULOCYTES NFR BLD AUTO: 0.2 % (ref 0–0.5)
LYMPHOCYTES # BLD AUTO: 1.2 K/UL (ref 1–4.8)
LYMPHOCYTES NFR BLD: 18.6 % (ref 18–48)
MCH RBC QN AUTO: 31.4 PG (ref 27–31)
MCHC RBC AUTO-ENTMCNC: 33.4 G/DL (ref 32–36)
MCV RBC AUTO: 94 FL (ref 82–98)
MONOCYTES # BLD AUTO: 0.4 K/UL (ref 0.3–1)
MONOCYTES NFR BLD: 6.4 % (ref 4–15)
NEUTROPHILS # BLD AUTO: 4.7 K/UL (ref 1.8–7.7)
NEUTROPHILS NFR BLD: 72.2 % (ref 38–73)
NRBC BLD-RTO: 0 /100 WBC
PLATELET # BLD AUTO: 173 K/UL (ref 150–450)
PMV BLD AUTO: 10.8 FL (ref 9.2–12.9)
POTASSIUM SERPL-SCNC: 4.1 MMOL/L (ref 3.5–5.1)
PROT SERPL-MCNC: 6.4 G/DL (ref 6–8.4)
RBC # BLD AUTO: 4.01 M/UL (ref 4–5.4)
SODIUM SERPL-SCNC: 139 MMOL/L (ref 136–145)
WBC # BLD AUTO: 6.56 K/UL (ref 3.9–12.7)

## 2021-04-14 PROCEDURE — 25000003 PHARM REV CODE 250: Performed by: STUDENT IN AN ORGANIZED HEALTH CARE EDUCATION/TRAINING PROGRAM

## 2021-04-14 PROCEDURE — 80053 COMPREHEN METABOLIC PANEL: CPT | Performed by: STUDENT IN AN ORGANIZED HEALTH CARE EDUCATION/TRAINING PROGRAM

## 2021-04-14 PROCEDURE — 85025 COMPLETE CBC W/AUTO DIFF WBC: CPT | Performed by: STUDENT IN AN ORGANIZED HEALTH CARE EDUCATION/TRAINING PROGRAM

## 2021-04-14 PROCEDURE — 99233 SBSQ HOSP IP/OBS HIGH 50: CPT | Mod: ,,, | Performed by: PSYCHIATRY & NEUROLOGY

## 2021-04-14 PROCEDURE — 36415 COLL VENOUS BLD VENIPUNCTURE: CPT | Performed by: STUDENT IN AN ORGANIZED HEALTH CARE EDUCATION/TRAINING PROGRAM

## 2021-04-14 PROCEDURE — 99233 PR SUBSEQUENT HOSPITAL CARE,LEVL III: ICD-10-PCS | Mod: ,,, | Performed by: PSYCHIATRY & NEUROLOGY

## 2021-04-14 PROCEDURE — 25000003 PHARM REV CODE 250: Performed by: NURSE PRACTITIONER

## 2021-04-14 PROCEDURE — 99233 PR SUBSEQUENT HOSPITAL CARE,LEVL III: ICD-10-PCS | Mod: ,,, | Performed by: NURSE PRACTITIONER

## 2021-04-14 PROCEDURE — 99233 SBSQ HOSP IP/OBS HIGH 50: CPT | Mod: ,,, | Performed by: NURSE PRACTITIONER

## 2021-04-14 PROCEDURE — 94761 N-INVAS EAR/PLS OXIMETRY MLT: CPT

## 2021-04-14 PROCEDURE — 11000001 HC ACUTE MED/SURG PRIVATE ROOM

## 2021-04-14 RX ORDER — METOPROLOL TARTRATE 50 MG/1
100 TABLET ORAL 2 TIMES DAILY
Status: DISCONTINUED | OUTPATIENT
Start: 2021-04-14 | End: 2021-04-15 | Stop reason: HOSPADM

## 2021-04-14 RX ORDER — AMIODARONE HYDROCHLORIDE 100 MG/1
100 TABLET ORAL DAILY
Status: DISCONTINUED | OUTPATIENT
Start: 2021-04-14 | End: 2021-04-15 | Stop reason: HOSPADM

## 2021-04-14 RX ADMIN — MUPIROCIN: 20 OINTMENT TOPICAL at 08:04

## 2021-04-14 RX ADMIN — AMIODARONE HYDROCHLORIDE 100 MG: 100 TABLET ORAL at 10:04

## 2021-04-14 RX ADMIN — ATORVASTATIN CALCIUM 40 MG: 40 TABLET, FILM COATED ORAL at 10:04

## 2021-04-14 RX ADMIN — METOPROLOL TARTRATE 100 MG: 50 TABLET, FILM COATED ORAL at 10:04

## 2021-04-14 RX ADMIN — APIXABAN 5 MG: 5 TABLET, FILM COATED ORAL at 08:04

## 2021-04-14 RX ADMIN — ASPIRIN 81 MG CHEWABLE TABLET 81 MG: 81 TABLET CHEWABLE at 10:04

## 2021-04-14 RX ADMIN — MUPIROCIN: 20 OINTMENT TOPICAL at 10:04

## 2021-04-14 RX ADMIN — LEVETIRACETAM 1000 MG: 500 TABLET ORAL at 10:04

## 2021-04-14 RX ADMIN — MONTELUKAST 10 MG: 10 TABLET, FILM COATED ORAL at 10:04

## 2021-04-14 RX ADMIN — LEVETIRACETAM 1000 MG: 500 TABLET ORAL at 08:04

## 2021-04-14 RX ADMIN — APIXABAN 5 MG: 5 TABLET, FILM COATED ORAL at 10:04

## 2021-04-15 ENCOUNTER — PATIENT MESSAGE (OUTPATIENT)
Dept: RESEARCH | Facility: HOSPITAL | Age: 61
End: 2021-04-15

## 2021-04-15 VITALS
HEIGHT: 67 IN | RESPIRATION RATE: 18 BRPM | WEIGHT: 194 LBS | TEMPERATURE: 98 F | BODY MASS INDEX: 30.45 KG/M2 | DIASTOLIC BLOOD PRESSURE: 65 MMHG | OXYGEN SATURATION: 94 % | HEART RATE: 53 BPM | SYSTOLIC BLOOD PRESSURE: 111 MMHG

## 2021-04-15 PROBLEM — I47.20 V TACH: Status: RESOLVED | Noted: 2021-04-12 | Resolved: 2021-04-15

## 2021-04-15 LAB
ALBUMIN SERPL BCP-MCNC: 3.9 G/DL (ref 3.5–5.2)
ALP SERPL-CCNC: 53 U/L (ref 55–135)
ALT SERPL W/O P-5'-P-CCNC: 25 U/L (ref 10–44)
ANION GAP SERPL CALC-SCNC: 12 MMOL/L (ref 8–16)
AST SERPL-CCNC: 30 U/L (ref 10–40)
BASOPHILS # BLD AUTO: 0.05 K/UL (ref 0–0.2)
BASOPHILS NFR BLD: 0.8 % (ref 0–1.9)
BILIRUB SERPL-MCNC: 0.7 MG/DL (ref 0.1–1)
BUN SERPL-MCNC: 17 MG/DL (ref 6–20)
CALCIUM SERPL-MCNC: 8.7 MG/DL (ref 8.7–10.5)
CHLORIDE SERPL-SCNC: 108 MMOL/L (ref 95–110)
CO2 SERPL-SCNC: 22 MMOL/L (ref 23–29)
CREAT SERPL-MCNC: 0.8 MG/DL (ref 0.5–1.4)
DIFFERENTIAL METHOD: ABNORMAL
EOSINOPHIL # BLD AUTO: 0.2 K/UL (ref 0–0.5)
EOSINOPHIL NFR BLD: 3.7 % (ref 0–8)
ERYTHROCYTE [DISTWIDTH] IN BLOOD BY AUTOMATED COUNT: 12.7 % (ref 11.5–14.5)
EST. GFR  (AFRICAN AMERICAN): >60 ML/MIN/1.73 M^2
EST. GFR  (NON AFRICAN AMERICAN): >60 ML/MIN/1.73 M^2
GLUCOSE SERPL-MCNC: 86 MG/DL (ref 70–110)
HCT VFR BLD AUTO: 38.3 % (ref 37–48.5)
HGB BLD-MCNC: 12.9 G/DL (ref 12–16)
IMM GRANULOCYTES # BLD AUTO: 0.01 K/UL (ref 0–0.04)
IMM GRANULOCYTES NFR BLD AUTO: 0.2 % (ref 0–0.5)
LYMPHOCYTES # BLD AUTO: 1.5 K/UL (ref 1–4.8)
LYMPHOCYTES NFR BLD: 23.1 % (ref 18–48)
MCH RBC QN AUTO: 31.5 PG (ref 27–31)
MCHC RBC AUTO-ENTMCNC: 33.7 G/DL (ref 32–36)
MCV RBC AUTO: 93 FL (ref 82–98)
MONOCYTES # BLD AUTO: 0.5 K/UL (ref 0.3–1)
MONOCYTES NFR BLD: 8.3 % (ref 4–15)
NEUTROPHILS # BLD AUTO: 4 K/UL (ref 1.8–7.7)
NEUTROPHILS NFR BLD: 63.9 % (ref 38–73)
NRBC BLD-RTO: 0 /100 WBC
PLATELET # BLD AUTO: 172 K/UL (ref 150–450)
PMV BLD AUTO: 11 FL (ref 9.2–12.9)
POTASSIUM SERPL-SCNC: 3.9 MMOL/L (ref 3.5–5.1)
PROT SERPL-MCNC: 6.6 G/DL (ref 6–8.4)
RBC # BLD AUTO: 4.1 M/UL (ref 4–5.4)
SODIUM SERPL-SCNC: 142 MMOL/L (ref 136–145)
WBC # BLD AUTO: 6.29 K/UL (ref 3.9–12.7)

## 2021-04-15 PROCEDURE — 99233 SBSQ HOSP IP/OBS HIGH 50: CPT | Mod: ,,, | Performed by: NURSE PRACTITIONER

## 2021-04-15 PROCEDURE — 25000003 PHARM REV CODE 250: Performed by: STUDENT IN AN ORGANIZED HEALTH CARE EDUCATION/TRAINING PROGRAM

## 2021-04-15 PROCEDURE — 36415 COLL VENOUS BLD VENIPUNCTURE: CPT | Performed by: STUDENT IN AN ORGANIZED HEALTH CARE EDUCATION/TRAINING PROGRAM

## 2021-04-15 PROCEDURE — 25000003 PHARM REV CODE 250: Performed by: NURSE PRACTITIONER

## 2021-04-15 PROCEDURE — 99233 PR SUBSEQUENT HOSPITAL CARE,LEVL III: ICD-10-PCS | Mod: ,,, | Performed by: NURSE PRACTITIONER

## 2021-04-15 PROCEDURE — 80053 COMPREHEN METABOLIC PANEL: CPT | Performed by: STUDENT IN AN ORGANIZED HEALTH CARE EDUCATION/TRAINING PROGRAM

## 2021-04-15 PROCEDURE — 94761 N-INVAS EAR/PLS OXIMETRY MLT: CPT

## 2021-04-15 PROCEDURE — 85025 COMPLETE CBC W/AUTO DIFF WBC: CPT | Performed by: STUDENT IN AN ORGANIZED HEALTH CARE EDUCATION/TRAINING PROGRAM

## 2021-04-15 RX ORDER — NAPROXEN SODIUM 220 MG/1
81 TABLET, FILM COATED ORAL DAILY
Qty: 360 TABLET | Refills: 11 | Status: SHIPPED | OUTPATIENT
Start: 2021-04-16 | End: 2022-05-12

## 2021-04-15 RX ORDER — METOPROLOL TARTRATE 100 MG/1
100 TABLET ORAL 2 TIMES DAILY
Qty: 60 TABLET | Refills: 11 | Status: SHIPPED | OUTPATIENT
Start: 2021-04-15 | End: 2022-05-12

## 2021-04-15 RX ORDER — LEVETIRACETAM 1000 MG/1
1000 TABLET ORAL 2 TIMES DAILY
Qty: 60 TABLET | Refills: 4 | Status: SHIPPED | OUTPATIENT
Start: 2021-04-15 | End: 2021-09-12

## 2021-04-15 RX ADMIN — AMIODARONE HYDROCHLORIDE 100 MG: 100 TABLET ORAL at 09:04

## 2021-04-15 RX ADMIN — MUPIROCIN: 20 OINTMENT TOPICAL at 08:04

## 2021-04-15 RX ADMIN — ATORVASTATIN CALCIUM 40 MG: 40 TABLET, FILM COATED ORAL at 09:04

## 2021-04-15 RX ADMIN — MONTELUKAST 10 MG: 10 TABLET, FILM COATED ORAL at 09:04

## 2021-04-15 RX ADMIN — ASPIRIN 81 MG CHEWABLE TABLET 81 MG: 81 TABLET CHEWABLE at 09:04

## 2021-04-15 RX ADMIN — APIXABAN 5 MG: 5 TABLET, FILM COATED ORAL at 09:04

## 2021-04-15 RX ADMIN — METOPROLOL TARTRATE 100 MG: 50 TABLET, FILM COATED ORAL at 09:04

## 2021-04-15 RX ADMIN — LEVETIRACETAM 1000 MG: 500 TABLET ORAL at 09:04

## 2021-04-19 LAB — VIT B1 BLD-MCNC: 54 UG/L (ref 38–122)

## 2022-05-12 ENCOUNTER — OFFICE VISIT (OUTPATIENT)
Dept: URGENT CARE | Facility: CLINIC | Age: 62
End: 2022-05-12
Payer: COMMERCIAL

## 2022-05-12 VITALS
HEART RATE: 65 BPM | WEIGHT: 208 LBS | TEMPERATURE: 99 F | BODY MASS INDEX: 32.65 KG/M2 | DIASTOLIC BLOOD PRESSURE: 72 MMHG | RESPIRATION RATE: 18 BRPM | HEIGHT: 67 IN | OXYGEN SATURATION: 97 % | SYSTOLIC BLOOD PRESSURE: 109 MMHG

## 2022-05-12 DIAGNOSIS — R05.9 COUGH: ICD-10-CM

## 2022-05-12 DIAGNOSIS — U07.1 COVID: Primary | ICD-10-CM

## 2022-05-12 LAB
CTP QC/QA: YES
SARS-COV-2 RDRP RESP QL NAA+PROBE: POSITIVE

## 2022-05-12 PROCEDURE — 3008F BODY MASS INDEX DOCD: CPT | Mod: CPTII,S$GLB,,

## 2022-05-12 PROCEDURE — 3074F PR MOST RECENT SYSTOLIC BLOOD PRESSURE < 130 MM HG: ICD-10-PCS | Mod: CPTII,S$GLB,,

## 2022-05-12 PROCEDURE — 1160F PR REVIEW ALL MEDS BY PRESCRIBER/CLIN PHARMACIST DOCUMENTED: ICD-10-PCS | Mod: CPTII,S$GLB,,

## 2022-05-12 PROCEDURE — 3078F PR MOST RECENT DIASTOLIC BLOOD PRESSURE < 80 MM HG: ICD-10-PCS | Mod: CPTII,S$GLB,,

## 2022-05-12 PROCEDURE — U0002 COVID-19 LAB TEST NON-CDC: HCPCS | Mod: QW,S$GLB,,

## 2022-05-12 PROCEDURE — 1160F RVW MEDS BY RX/DR IN RCRD: CPT | Mod: CPTII,S$GLB,,

## 2022-05-12 PROCEDURE — 3078F DIAST BP <80 MM HG: CPT | Mod: CPTII,S$GLB,,

## 2022-05-12 PROCEDURE — 3008F PR BODY MASS INDEX (BMI) DOCUMENTED: ICD-10-PCS | Mod: CPTII,S$GLB,,

## 2022-05-12 PROCEDURE — 99214 PR OFFICE/OUTPT VISIT, EST, LEVL IV, 30-39 MIN: ICD-10-PCS | Mod: S$GLB,,,

## 2022-05-12 PROCEDURE — 3074F SYST BP LT 130 MM HG: CPT | Mod: CPTII,S$GLB,,

## 2022-05-12 PROCEDURE — 99214 OFFICE O/P EST MOD 30 MIN: CPT | Mod: S$GLB,,,

## 2022-05-12 PROCEDURE — 1159F PR MEDICATION LIST DOCUMENTED IN MEDICAL RECORD: ICD-10-PCS | Mod: CPTII,S$GLB,,

## 2022-05-12 PROCEDURE — U0002: ICD-10-PCS | Mod: QW,S$GLB,,

## 2022-05-12 PROCEDURE — 1159F MED LIST DOCD IN RCRD: CPT | Mod: CPTII,S$GLB,,

## 2022-05-12 RX ORDER — BENZONATATE 100 MG/1
100 CAPSULE ORAL 3 TIMES DAILY PRN
Qty: 30 CAPSULE | Refills: 0 | Status: SHIPPED | OUTPATIENT
Start: 2022-05-12 | End: 2022-05-22

## 2022-05-12 RX ORDER — PRAVASTATIN SODIUM 40 MG/1
40 TABLET ORAL DAILY
COMMUNITY
Start: 2022-02-23

## 2022-05-12 NOTE — PATIENT INSTRUCTIONS
You have tested positive for COVID-19 today.           ISOLATION    If you tested positive and do not have symptoms, you must isolate for 5 days starting on the day of the positive test.          If you tested positive and have symptoms, you must isolate for 5 days starting on the day of the first symptoms,  not the day of the positive test.       Both the CDC and the [facility] emphasize that you do not test out of isolation.         After 5 days, if your symptoms have improved and you have not had fever on day 5, you can return to the community on day 6- NO TESTING REQUIRED!          In fact, we do not retest if you were positive in the last 90 days.         After your 5 days of isolation are completed, the CDC recommends strict mask use for the first 5 days that you come out of isolation.     You were tested for Coronavirus on today.  The results usually come back within 2-3 days.  Once we receive the results, we will give you a call.  Until then, please stay home and away from others until the results are confirmed.  Please wear a mask when you are in contact with  Your family members.  Avoid social contact unless an emergency.     Your test was POSITIVE for COVID-19 (coronavirus).       Please isolate yourself at home.  You may leave home and/or return to work once the following conditions are met:    If you were not hospitalized and are not moderately to severely immunocompromised:   More than 5 days since symptoms first appeared AND  More than 24 hours fever free without medications AND  Symptoms are improving  Continue to wear a mask around others for 5 additional days.    If you were hospitalized OR are moderately to severely immunocompromised:  More than 20 days since symptoms first appeared  More than 24 hours fever free without medications  Symptoms have improved    If you had no symptoms but tested positive:  More than 5 days since the date of the first positive test (20 days if moderately to severely  immunocompromised). If you develop symptoms, then use the guidelines above.  Continue to wear a mask around others for 5 additional days.      Contact Tracing    As one of the next steps, you will receive a call or text from the [place] [facility] of [facility] ([facility]) COVID-19 Tracing Team. See the contact information below so you know not to ignore the health departments call. It is important that you contact them back immediately so they can help.      Contact Tracer Number:  [phone]  Caller ID for most carriers: [facility]     What is contact tracing?  Contact tracing is a process that helps identify everyone who has been in close contact with an infected person. Contact tracers let those people know they may have been exposed and guide them on next steps. Confidentiality is important for everyone; no one will be told who may have exposed them to the virus.  Your involvement is important. The more we know about where and how this virus is spreading, the better chance we have at stopping it from spreading further.  What does exposure mean?  Exposure means you have been within 6 feet for more than 15 minutes with a person who has or had COVID-19.  What kind of questions do the contact tracers ask?  A contact tracer will confirm your basic contact information including name, address, phone number, and next of kin, as well as asking about any symptoms you may have had. Theyll also ask you how you think you may have gotten sick, such as places where you may have been exposed to the virus, and people you were with. Those names will never be shared with anyone outside of that call, and will only be used to help trace and stop the spread of the virus.   I have privacy concerns. How will the state use my information?  Your privacy about your health is important. All calls are completed using call centers that use the appropriate health privacy protection measures (HIPAA compliance), meaning that your patient  information is safe. No one will ever ask you any questions related to immigration status. Your health comes first.   Do I have to participate?  You do not have to participate, but we strongly encourage you to. Contact tracing can help us catch and control new outbreaks as theyre developing to keep your friends and family safe.   What if I dont hear from anyone?  If you dont receive a call within 24 hours, you can call the number above right away to inquire about your status. That line is open from 8:00 am - 8:00 p.m., 7 days a week.  Contact tracing saves lives! Together, we have the power to beat this virus and keep our loved ones and neighbors safe.    For more information see CDC link below.      https://www.cdc.gov/coronavirus/2019-ncov/hcp/guidance-prevent-spread.html#precautions        Sources:  [facility], [place] [facility] and [facility]           Sincerely,     Elise Aguilar NP

## 2022-05-12 NOTE — LETTER
[address] [place] [address]  Phone: [phone]  Fax: [phone]          Return to Work/School    Patient: Jennifer Murray  YOB: 1960   Date: 05/12/2022     To Whom It May Concern:     Jennifer Murray was in contact with/seen in my office on 05/12/2022. COVID-19 is present in our communities across the state. There is limited testing for COVID at this time, so not all patients can be tested. In this situation, your employee meets the following criteria:     Jennifer Murray has met the criteria for COVID-19 testing and has a POSITIVE result. She can return to work once they are asymptomatic for 24 hours without the use of fever reducing medications AND at least five days from the start of symptoms (or from the first positive result if they have no symptoms).  IF criteria above is met patient is cleared 05/17/2022     If you have any questions or concerns, or if I can be of further assistance, please do not hesitate to contact me.     Sincerely,    Elise Aguilar, NP

## 2022-05-12 NOTE — PROGRESS NOTES
"Subjective:       Patient ID: Jennifer Murray is a 62 y.o. female.    Vitals:  height is 5' 7" (1.702 m) and weight is 94.3 kg (208 lb). Her temperature is 98.6 °F (37 °C). Her blood pressure is 109/72 and her pulse is 65. Her respiration is 18 and oxygen saturation is 97%.     Chief Complaint: Cough, Sore Throat, and Headache    Patient presents to clinic with cough/minor sore throat/bodyaches/fatigue/headache x 1 day.    Cough  This is a new problem. The current episode started yesterday. The problem has been unchanged. The problem occurs every few hours. The cough is productive of sputum. Associated symptoms include chills, headaches, myalgias, postnasal drip and a sore throat. Pertinent negatives include no chest pain, ear pain, fever, rash, shortness of breath or wheezing. The symptoms are aggravated by lying down. She has tried nothing for the symptoms. The treatment provided no relief.   Sore Throat   Associated symptoms include congestion, coughing and headaches. Pertinent negatives include no abdominal pain, diarrhea, ear discharge, ear pain, neck pain, shortness of breath, stridor or vomiting.   Headache   Associated symptoms include coughing, sinus pressure and a sore throat. Pertinent negatives include no abdominal pain, dizziness, ear pain, fever, nausea, neck pain, numbness or vomiting. There is no history of migraine headaches.       Constitution: Positive for chills and fatigue. Negative for activity change, appetite change, sweating, fever and generalized weakness.        Reports chills since yesterday that has improved. Reports fatigue since yesterday.    HENT: Positive for congestion, postnasal drip, sinus pressure and sore throat. Negative for ear pain, ear discharge and sinus pain.         Reports having sinus pressure, nasal congestion, sore throat, and PND since yesterday.    Neck: Positive for neck stiffness. Negative for neck pain.   Cardiovascular: Negative for chest pain, leg swelling, " palpitations, sob on exertion and passing out.   Eyes: Negative for eye trauma.   Respiratory: Positive for cough and sputum production. Negative for chest tightness, shortness of breath, stridor and wheezing.         Reports producing white sputum with cough.    Gastrointestinal: Negative for abdominal pain, nausea, vomiting, constipation and diarrhea.   Musculoskeletal: Positive for pain and muscle ache. Negative for trauma, joint pain and joint swelling.        Reports body aches in neck and shoulder since yesterday.    Skin: Negative for rash, wound and hives.   Allergic/Immunologic: Negative for hives.   Neurological: Positive for headaches. Negative for dizziness, history of vertigo, light-headedness, history of migraines, disorientation, altered mental status, loss of consciousness, numbness and tingling.        Slight post HA since yesterday.    Psychiatric/Behavioral: Negative for altered mental status and disorientation.       Objective:      Physical Exam   Constitutional: She is oriented to person, place, and time. She appears well-developed. She is cooperative.  Non-toxic appearance. She does not appear ill. No distress.   HENT:   Head: Normocephalic and atraumatic.   Ears:   Right Ear: Hearing, tympanic membrane, external ear and ear canal normal.   Left Ear: Hearing, tympanic membrane, external ear and ear canal normal.   Nose: Nose normal. No mucosal edema, rhinorrhea or nasal deformity. No epistaxis. Right sinus exhibits no maxillary sinus tenderness and no frontal sinus tenderness. Left sinus exhibits no maxillary sinus tenderness and no frontal sinus tenderness.   Mouth/Throat: Uvula is midline and mucous membranes are normal. No trismus in the jaw. Normal dentition. No uvula swelling. Posterior oropharyngeal erythema present. No oropharyngeal exudate, posterior oropharyngeal edema or tonsillar abscesses. Tonsils are 0 on the right. Tonsils are 0 on the left. No tonsillar exudate.       Eyes:  Conjunctivae and lids are normal. No scleral icterus.   Neck: Trachea normal and phonation normal. Neck supple. No edema present. No erythema present. No neck rigidity present.   Cardiovascular: Normal rate, regular rhythm and normal pulses.   Murmur heard.  Pulmonary/Chest: Effort normal and breath sounds normal. No accessory muscle usage or stridor. No apnea, no tachypnea and no bradypnea. No respiratory distress. She has no decreased breath sounds. She has no wheezes. She has no rhonchi. She has no rales.   Abdominal: Normal appearance.   Musculoskeletal: Normal range of motion.         General: No deformity. Normal range of motion.   Neurological: She is alert and oriented to person, place, and time. She exhibits normal muscle tone. Coordination normal.   Skin: Skin is warm, dry, intact, not diaphoretic and not pale.   Psychiatric: Her speech is normal and behavior is normal. Judgment and thought content normal.   Nursing note and vitals reviewed.        Results for orders placed or performed in visit on 05/12/22   POCT COVID-19 Rapid Screening   Result Value Ref Range    POC Rapid COVID Positive (A) Negative     Acceptable Yes      Assessment:       1. COVID    2. Cough          Plan:        Discussed positive COVID test results with PT. PT should Isolate for 5 days after symptoms start and then wear a mask for 5 days after when around people. Discussed is if condition worsens or fails to improve that PT receive another evaluation at the ER immediately or contact your PCP to discuss concerns or return here. Reviewed was for development of SOB, CP, lightheadedness, dizziness, PT should go to ED. Also addressed is the use of Zyrtec, for congestion and sinus pressure. Also reviewed was the use of Flonase and to use as directed by medication label directs. Also discussed was rest and fluids as well as Tylenol or ibuprofen can also be used as directed for pain or fever. Discussed was to take Tessalon  for cough, COVID risk Score and [facility] referral. PT verbalized understanding and agreed with plan and diagnosis. PT ambulatory out of clinic NAD.  COVID    Cough  -     POCT COVID-19 Rapid Screening  -     Ambulatory referral/consult to [facility] Infusion    Other orders  -     COVID-19 Home Symptom Monitoring  - Duration (days): 14  -     benzonatate (TESSALON) 100 MG capsule; Take 1 capsule (100 mg total) by mouth 3 (three) times daily as needed for Cough.  Dispense: 30 capsule; Refill: 0           Medical Decision Making:   Urgent Care Management:  Covid risk score 3

## 2022-05-13 ENCOUNTER — INFUSION (OUTPATIENT)
Dept: INFECTIOUS DISEASES | Facility: HOSPITAL | Age: 62
End: 2022-05-13
Attending: INTERNAL MEDICINE
Payer: COMMERCIAL

## 2022-05-13 VITALS
DIASTOLIC BLOOD PRESSURE: 75 MMHG | WEIGHT: 207 LBS | TEMPERATURE: 98 F | HEIGHT: 67 IN | SYSTOLIC BLOOD PRESSURE: 122 MMHG | OXYGEN SATURATION: 99 % | BODY MASS INDEX: 32.49 KG/M2 | HEART RATE: 55 BPM | RESPIRATION RATE: 18 BRPM

## 2022-05-13 DIAGNOSIS — U07.1 COVID-19: Primary | ICD-10-CM

## 2022-05-13 PROCEDURE — 63600175 PHARM REV CODE 636 W HCPCS: Performed by: INTERNAL MEDICINE

## 2022-05-13 PROCEDURE — M0222 HC IV INJECTION, BEBTELOVIMAB, INCL POST ADMIN MONIT: HCPCS | Performed by: INTERNAL MEDICINE

## 2022-05-13 RX ORDER — DIPHENHYDRAMINE HYDROCHLORIDE 50 MG/ML
25 INJECTION INTRAMUSCULAR; INTRAVENOUS
Status: ACTIVE | OUTPATIENT
Start: 2022-05-13 | End: 2022-05-14

## 2022-05-13 RX ORDER — ONDANSETRON 4 MG/1
4 TABLET, ORALLY DISINTEGRATING ORAL
Status: ACTIVE | OUTPATIENT
Start: 2022-05-13 | End: 2022-05-14

## 2022-05-13 RX ORDER — ACETAMINOPHEN 325 MG/1
650 TABLET ORAL
Status: ACTIVE | OUTPATIENT
Start: 2022-05-13 | End: 2022-05-14

## 2022-05-13 RX ORDER — ALBUTEROL SULFATE 90 UG/1
2 AEROSOL, METERED RESPIRATORY (INHALATION)
Status: ACTIVE | OUTPATIENT
Start: 2022-05-13 | End: 2022-05-16

## 2022-05-13 RX ORDER — EPINEPHRINE 0.3 MG/.3ML
0.3 INJECTION SUBCUTANEOUS
Status: ACTIVE | OUTPATIENT
Start: 2022-05-13 | End: 2022-05-16

## 2022-05-13 RX ORDER — BEBTELOVIMAB 87.5 MG/ML
175 INJECTION, SOLUTION INTRAVENOUS
Status: COMPLETED | OUTPATIENT
Start: 2022-05-13 | End: 2022-05-13

## 2022-05-13 RX ADMIN — BEBTELOVIMAB 175 MG: 87.5 INJECTION, SOLUTION INTRAVENOUS at 10:05

## 2022-05-13 NOTE — PROGRESS NOTES
Bebtelovimab IV Injection administered. Pt tolerated injection well. No S/S of injection reaction noted at present time. One hour observation started.     Patient received injection at 1005.

## 2022-05-13 NOTE — PROGRESS NOTES
Patient remains with no signs of complications noted. Patient received Bebtelovimab IV Injection according to FDA recommendations and [facility] SOP without complications noted and left with mask in place. Drug fact sheet provided. Pt discharged home. Ambulatory. Remains AAox3. No distress noted. RR even and unlabored.

## 2022-05-13 NOTE — PROGRESS NOTES
Patient arrives for Bebtelovimab IV Injection. Ambulatory. Pt AAox3. No distress noted. RR even and unlabored.     Symptoms and onset date:  5/11/22 pm Cough, body aches, heavy and burning eyes    Tested COVID + on 5/12/22 am

## 2022-11-10 ENCOUNTER — CLINICAL SUPPORT (OUTPATIENT)
Dept: OTHER | Facility: CLINIC | Age: 62
End: 2022-11-10

## 2022-11-10 DIAGNOSIS — Z00.8 ENCOUNTER FOR OTHER GENERAL EXAMINATION: ICD-10-CM

## 2022-11-11 VITALS
BODY MASS INDEX: 34.06 KG/M2 | HEIGHT: 67 IN | SYSTOLIC BLOOD PRESSURE: 118 MMHG | WEIGHT: 217 LBS | DIASTOLIC BLOOD PRESSURE: 80 MMHG

## 2022-11-11 LAB
GLUCOSE SERPL-MCNC: 96 MG/DL (ref 60–140)
HDLC SERPL-MCNC: 47 MG/DL
POC CHOLESTEROL, LDL (DOCK): 103 MG/DL
POC CHOLESTEROL, TOTAL: 170 MG/DL
TRIGL SERPL-MCNC: 108 MG/DL

## (undated) DEVICE — KIT LEFT HEART MANIFOLD CUSTOM

## (undated) DEVICE — CONTRAST VISIPAQUE 150ML

## (undated) DEVICE — COVER PROBE US 5.5X58L NON LTX

## (undated) DEVICE — Device

## (undated) DEVICE — HEMOSTAT VASC BAND REG 24CM

## (undated) DEVICE — CATH ANGIO EXPO VENT PIGTL 6FR

## (undated) DEVICE — CATH RADIAL TIG 6FR 4.0 110CM

## (undated) DEVICE — WIRE WHOLEY 260CM

## (undated) DEVICE — KIT GLIDESHEATH SLEND 6FR 10CM

## (undated) DEVICE — DEFIBRILLATOR STAT PADZ II